# Patient Record
Sex: FEMALE | Race: WHITE | NOT HISPANIC OR LATINO
[De-identification: names, ages, dates, MRNs, and addresses within clinical notes are randomized per-mention and may not be internally consistent; named-entity substitution may affect disease eponyms.]

---

## 2018-08-21 ENCOUNTER — APPOINTMENT (OUTPATIENT)
Dept: BREAST CENTER | Facility: CLINIC | Age: 78
End: 2018-08-21

## 2019-02-05 ENCOUNTER — APPOINTMENT (OUTPATIENT)
Dept: BREAST CENTER | Facility: CLINIC | Age: 79
End: 2019-02-05
Payer: MEDICARE

## 2019-02-05 DIAGNOSIS — Z85.828 PERSONAL HISTORY OF OTHER MALIGNANT NEOPLASM OF SKIN: ICD-10-CM

## 2019-02-05 DIAGNOSIS — Z80.1 FAMILY HISTORY OF MALIGNANT NEOPLASM OF TRACHEA, BRONCHUS AND LUNG: ICD-10-CM

## 2019-02-05 DIAGNOSIS — Z82.3 FAMILY HISTORY OF STROKE: ICD-10-CM

## 2019-02-05 DIAGNOSIS — Z78.9 OTHER SPECIFIED HEALTH STATUS: ICD-10-CM

## 2019-02-05 DIAGNOSIS — Z82.49 FAMILY HISTORY OF ISCHEMIC HEART DISEASE AND OTHER DISEASES OF THE CIRCULATORY SYSTEM: ICD-10-CM

## 2019-02-05 PROCEDURE — 99215 OFFICE O/P EST HI 40 MIN: CPT

## 2019-02-05 RX ORDER — ALPRAZOLAM 0.5 MG/1
0.5 TABLET ORAL
Qty: 30 | Refills: 0 | Status: DISCONTINUED | COMMUNITY
Start: 2018-10-09

## 2019-02-05 NOTE — HISTORY OF PRESENT ILLNESS
[FreeTextEntry1] : This is a 79 yo female patient with a hx of breast cancer. Patient presents today for a follow up CBE. Patient is s/p right breast partial mastectomy on 02/05/2014 ([facility]). 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 Patient is s/p RTX in 05/2014. Patient is s/p right breast stereotactic biopsy on 12/10/2013. Pathology showed right breast 6:00 DCIS high nuclear grade, solid pattern with extension into lobules necrosis and calcifications ER/[place]+ HER2-, Ki-67 negative favorable tumor suppression gene (p53) negative favorable. Patient is s/p usd breast biopsy on 12/06/2013. 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 Patient is s/p left breast biopsy in 1980. Pathology showed sclerosing adenosis. \par \par Patient was last seen by medical oncologist (Dr. Malone) on 01/19/2018. Patient has no breast complaints today. \par \par She does  no perform SBE. \par She has not noticed a change in her breast or a breast lump.\par She has not noticed a change in her nipple or nipple area.\par She has not noticed a change in the skin of the breast.\par She is not experiencing nipple discharge.\par She is not experiencing breast pain.\par She has not noticed a lump or lymph node under the armpit. \par \par BREAST CANCER RISK FACTORS\par Menarche: 10\par Date of LMP: 1993\par Menopause: Post \par Grav:  5    Para: 4\par Age at first live birth: 24\par Nursed: No \par Hysterectomy: No \par Oophorectomy: No \par OCP: Yes in the past for 3-4 months \par HRT: No \par Last pap/pelvic exam: 07/2017 WNL ---- pelvic exam \par Related family history: None \par Ashkenazi: None \par Mastery risk assessment: N/A \par BRCA testing: No \par Bra size: 37C\par \par Last mammogram:      12/14/2018         Location: [facility]\par Report reviewed.                             \par Results: Birads 2\par No evidence of malignancy \par \par Last ultrasound:             12/14/2018             [facility]: [facility]\par Report reviewed.                             \par Results: Birads 2\par No evidence of malignancy \par \par Last MRI:   12/10/2013                                          Location: [facility]\par Report reviewed.\par Results: Birads 6 right breast Birads 2 left breast\par \par

## 2019-02-05 NOTE — REVIEW OF SYSTEMS
[Loss Of Hearing] : hearing loss [Nasal Discharge] : nasal discharge [Snoring] : snoring [Incontinence] : incontinence [Joint Pain] : joint pain [Anxiety] : anxiety [Depression] : depression [Heat Intolerance] : intolerance to heat [Loss of Hair] : loss of hair [Negative] : Heme/Lymph [de-identified] : sTRESS

## 2019-02-05 NOTE — PHYSICAL EXAM
[Normocephalic] : normocephalic [Atraumatic] : atraumatic [Supple] : supple [No Supraclavicular Adenopathy] : no supraclavicular adenopathy [Examined in the supine and seated position] : examined in the supine and seated position [No dominant masses] : no dominant masses in right breast  [No dominant masses] : no dominant masses left breast [No Nipple Retraction] : no left nipple retraction [No Nipple Discharge] : no left nipple discharge [No Axillary Lymphadenopathy] : no left axillary lymphadenopathy [No Edema] : no edema [No Rashes] : no rashes [No Ulceration] : no ulceration [de-identified] : s/p PmxI'll brawny induration, healed transverse incision 3:00 scar with increased nodularity along the lateral edge of the incision [de-identified] : Healed axillary incision

## 2019-02-05 NOTE — ASSESSMENT
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 Malone\par We reviewed risk reduction strategies including maintaining a BMI <25, limiting red meat intake and alcoholic beverages to 3 per week and exercise (150 min/ week low intensity or 75 min/week high intensity). And maintaining a normal vitamin D level.\par \par \par She knows to call or return sooner should any concerns or questions arise.\par

## 2019-05-07 ENCOUNTER — APPOINTMENT (OUTPATIENT)
Dept: BREAST CENTER | Facility: CLINIC | Age: 79
End: 2019-05-07
Payer: MEDICARE

## 2019-05-07 VITALS
HEIGHT: 61.5 IN | BODY MASS INDEX: 30.19 KG/M2 | WEIGHT: 162 LBS | DIASTOLIC BLOOD PRESSURE: 97 MMHG | HEART RATE: 82 BPM | SYSTOLIC BLOOD PRESSURE: 149 MMHG

## 2019-05-07 PROCEDURE — 99214 OFFICE O/P EST MOD 30 MIN: CPT

## 2019-05-07 NOTE — REVIEW OF SYSTEMS
[Nasal Discharge] : nasal discharge [Joint Pain] : joint pain [Incontinence] : incontinence [Snoring] : snoring [Loss of Hair] : loss of hair [Heartburn] : heartburn [Negative] : Psychiatric

## 2019-08-20 ENCOUNTER — APPOINTMENT (OUTPATIENT)
Dept: BREAST CENTER | Facility: CLINIC | Age: 79
End: 2019-08-20
Payer: MEDICARE

## 2019-08-20 VITALS
DIASTOLIC BLOOD PRESSURE: 80 MMHG | SYSTOLIC BLOOD PRESSURE: 120 MMHG | BODY MASS INDEX: 31.56 KG/M2 | WEIGHT: 165 LBS | HEIGHT: 60.5 IN | HEART RATE: 81 BPM

## 2019-08-20 PROCEDURE — 99215 OFFICE O/P EST HI 40 MIN: CPT

## 2019-08-20 RX ORDER — ANASTROZOLE TABLETS 1 MG/1
1 TABLET ORAL
Qty: 90 | Refills: 0 | Status: ACTIVE | COMMUNITY
Start: 2019-01-19

## 2019-08-20 RX ORDER — RANITIDINE 300 MG/1
300 TABLET ORAL
Qty: 90 | Refills: 0 | Status: DISCONTINUED | COMMUNITY
Start: 2018-08-26 | End: 2019-08-20

## 2019-08-20 RX ORDER — IPRATROPIUM BROMIDE 42 UG/1
0.06 SPRAY NASAL
Qty: 15 | Refills: 0 | Status: DISCONTINUED | COMMUNITY
Start: 2019-01-27 | End: 2019-08-20

## 2019-08-20 NOTE — REVIEW OF SYSTEMS
[Nasal Discharge] : nasal discharge [Snoring] : snoring [Heartburn] : heartburn [Joint Pain] : joint pain [Incontinence] : incontinence [Loss of Hair] : loss of hair [Negative] : Heme/Lymph

## 2019-08-20 NOTE — PHYSICAL EXAM
[Normocephalic] : normocephalic [Supple] : supple [Atraumatic] : atraumatic [No Supraclavicular Adenopathy] : no supraclavicular adenopathy [Examined in the supine and seated position] : examined in the supine and seated position [No dominant masses] : no dominant masses left breast [No dominant masses] : no dominant masses in right breast  [No Nipple Retraction] : no left nipple retraction [No Nipple Discharge] : no left nipple discharge [No Axillary Lymphadenopathy] : no left axillary lymphadenopathy [No Edema] : no edema [No Ulceration] : no ulceration [No Rashes] : no rashes [de-identified] : s/p PmxI'll brawny induration, healed transverse incision 3:00 scar with increased nodularity along the lateral edge of the incision unchanged from prior 8mm in size [de-identified] : Healed axillary incision

## 2019-08-20 NOTE — HISTORY OF PRESENT ILLNESS
[FreeTextEntry1] : This is a 80 yo female patient with a hx of breast cancer. Patient presents today for a follow up CBE. Patient is s/p right breast partial mastectomy on 02/05/2014 ([facility]). 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 Patient is s/p RTX in 05/2014. Patient is s/p right breast stereotactic biopsy on 12/10/2013. Pathology showed right breast 6:00 DCIS high nuclear grade, solid pattern with extension into lobules necrosis and calcifications ER/[place]+ HER2-, Ki-67 negative favorable tumor suppression gene (p53) negative favorable. Patient is s/p usd breast biopsy on 12/06/2013. 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 Patient is s/p left breast biopsy in 1980. Pathology showed sclerosing adenosis. \par \par Patient was last seen by medical oncologist (Dr. Malone) on 03/15/2019. Has appointment in September 2019 Patient has no breast complaints today. \par \par She does not perform SBE. \par She has not noticed a change in her breast or a breast lump.\par She has not noticed a change in her nipple or nipple area.\par She has not noticed a change in the skin of the breast.\par She is not experiencing nipple discharge.\par She is not experiencing breast pain.\par She has not noticed a lump or lymph node under the armpit. \par \par BREAST CANCER RISK FACTORS\par Menarche: 10\par Date of LMP: 1993\par Menopause: Post \par Grav:  5    Para: 4\par Age at first live birth: 24\par Nursed: No \par Hysterectomy: No \par Oophorectomy: No \par OCP: Yes in the past for 3-4 months \par HRT: No \par Last pap/pelvic exam: 07/2019 WNL ---- pelvic exam \par Related family history: None \par Ashkenazi: None \par Mastery risk assessment: N/A \par BRCA testing: No \par Bra size: 37C\par \par Last mammogram:      12/14/2018         Location: [facility]\par Report reviewed.                             \par Results: Birads 2\par No evidence of malignancy \par \par Last ultrasound:             12/14/2018             [facility]: [facility]\par Report reviewed.                             \par Results: Birads 2\par No evidence of malignancy \par \par Last MRI:   2/19/2019                                        Location: [facility]\par Report reviewed.\par Results: Birads 2 right breast Right breast developing focus of necrosis measuring 14 x 8 mm located 3-4 cm from the nipple probably correlating with the nodularity on physical exam. No evidence of malignancy bilaterally\par  Birads 1 left breast

## 2019-08-20 NOTE — ASSESSMENT
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 Malone\par We reviewed risk reduction strategies including maintaining a BMI <25, limiting red meat intake and alcoholic beverages to 3 per week and exercise (150 min/ week low intensity or 75 min/week high intensity). And maintaining a normal vitamin D level.\par \par \par She knows to call or return sooner should any concerns or questions arise.\par

## 2019-09-05 NOTE — PHYSICAL EXAM
[Normocephalic] : normocephalic [Atraumatic] : atraumatic [Supple] : supple [Examined in the supine and seated position] : examined in the supine and seated position [No Supraclavicular Adenopathy] : no supraclavicular adenopathy [No dominant masses] : no dominant masses in right breast  [No dominant masses] : no dominant masses left breast [No Nipple Retraction] : no left nipple retraction [No Nipple Discharge] : no left nipple discharge [No Axillary Lymphadenopathy] : no left axillary lymphadenopathy [No Rashes] : no rashes [No Edema] : no edema [No Ulceration] : no ulceration [de-identified] : s/p PmxI'll brawny induration, healed transverse incision 3:00 scar with increased nodularity along the lateral edge of the incision unchanged from prior [de-identified] : Healed axillary incision

## 2019-09-05 NOTE — HISTORY OF PRESENT ILLNESS
[FreeTextEntry1] : This is a 80 yo female patient with a hx of breast cancer. Patient presents today for a follow up CBE. Patient is s/p right breast partial mastectomy on 02/05/2014 ([facility]). 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 Patient is s/p RTX in 05/2014. Patient is s/p right breast stereotactic biopsy on 12/10/2013. Pathology showed right breast 6:00 DCIS high nuclear grade, solid pattern with extension into lobules necrosis and calcifications ER/[place]+ HER2-, Ki-67 negative favorable tumor suppression gene (p53) negative favorable. Patient is s/p usd breast biopsy on 12/06/2013. 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 Patient is s/p left breast biopsy in 1980. Pathology showed sclerosing adenosis. \par \par Patient was last seen by medical oncologist (Dr. Malone) on 03/15/2019. Has appointment in September 2019 Patient has no breast complaints today. \par \par She does not perform SBE. \par She has not noticed a change in her breast or a breast lump.\par She has not noticed a change in her nipple or nipple area.\par She has not noticed a change in the skin of the breast.\par She is not experiencing nipple discharge.\par She is not experiencing breast pain.\par She has not noticed a lump or lymph node under the armpit. \par \par BREAST CANCER RISK FACTORS\par Menarche: 10\par Date of LMP: 1993\par Menopause: Post \par Grav:  5    Para: 4\par Age at first live birth: 24\par Nursed: No \par Hysterectomy: No \par Oophorectomy: No \par OCP: Yes in the past for 3-4 months \par HRT: No \par Last pap/pelvic exam: 07/2019 WNL ---- pelvic exam \par Related family history: None \par Ashkenazi: None \par Mastery risk assessment: N/A \par BRCA testing: No \par Bra size: 37C\par \par Last mammogram:      12/14/2018         Location: [facility]\par Report reviewed.                             \par Results: Birads 2\par No evidence of malignancy \par \par Last ultrasound:             12/14/2018             Location: [facility]\par Report reviewed.                             \par Results: Birads 2\par No evidence of malignancy \par \par Last MRI:   2/19/2019                                        Location: [facility]\par Report reviewed.\par Results: Birads 2 right breast Right breast developing focus of necrosis measuring 14 x 8 mm located 3-4 cm from the nipple probably correlating with the nodularity on physical exam. No evidence of malignancy bilaterally\par  Birads 1 left breast

## 2019-09-05 NOTE — ASSESSMENT
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 Malone\par We reviewed risk reduction strategies including maintaining a BMI <25, limiting red meat intake and alcoholic beverages to 3 per week and exercise (150 min/ week low intensity or 75 min/week high intensity). And maintaining a normal vitamin D level.\par \par \par She knows to call or return sooner should any concerns or questions arise.\par

## 2020-01-07 ENCOUNTER — APPOINTMENT (OUTPATIENT)
Dept: BREAST CENTER | Facility: CLINIC | Age: 80
End: 2020-01-07
Payer: MEDICARE

## 2020-01-07 VITALS
SYSTOLIC BLOOD PRESSURE: 127 MMHG | WEIGHT: 161 LBS | DIASTOLIC BLOOD PRESSURE: 87 MMHG | BODY MASS INDEX: 30.4 KG/M2 | HEART RATE: 83 BPM | HEIGHT: 61 IN

## 2020-01-07 PROCEDURE — 99215 OFFICE O/P EST HI 40 MIN: CPT

## 2020-01-07 NOTE — HISTORY OF PRESENT ILLNESS
[FreeTextEntry1] : This is a 80 yo female patient with a hx of breast cancer. Patient presents today for a follow up CBE. Patient is s/p right breast partial mastectomy on 02/05/2014 ([facility]). 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 Patient is s/p RTX in 05/2014. Patient is s/p right breast stereotactic biopsy on 12/10/2013. Pathology showed right breast 6:00 DCIS high nuclear grade, solid pattern with extension into lobules necrosis and calcifications ER/[place]+ HER2-, Ki-67 negative favorable tumor suppression gene (p53) negative favorable. Patient is s/p usd breast biopsy on 12/06/2013. 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 Patient is s/p left breast biopsy in 1980. Pathology showed sclerosing adenosis. \par \par Patient was last seen by medical oncologist (Dr. Malone) on 09/2019. Has appointment in March 2019 Patient has no breast complaints today. \par \par She does not perform SBE. \par She has not noticed a change in her breast or a breast lump.\par She has not noticed a change in her nipple or nipple area.\par She has not noticed a change in the skin of the breast.\par She is not experiencing nipple discharge.\par She is not experiencing breast pain.\par She has not noticed a lump or lymph node under the armpit. \par \par BREAST CANCER RISK FACTORS\par Menarche: 10\par Date of LMP: 1993\par Menopause: Post \par Grav:  5    Para: 4\par Age at first live birth: 24\par Nursed: No \par Hysterectomy: No \par Oophorectomy: No \par OCP: Yes in the past for 3-4 months \par HRT: No \par Last pap/pelvic exam: 07/2019 WNL ---- pelvic exam \par Related family history: None \par Ashkenazi: None \par Mastery risk assessment: N/A \par BRCA testing: No \par Bra size: 37C\par \par Last mammogram:      12/16/2019         Location: [facility]\par Report reviewed.                             \par Results: Birads 2\par No evidence of malignancy \par \par Last ultrasound:             12/16/2019             [facility]: [facility]\par Report reviewed.                             \par Results: Birads 2\par No evidence of malignancy \par \par Last MRI:   2/19/2019                                        [facility]\par Report reviewed.\par Results: Birads 2 right breast Right breast developing focus of necrosis measuring 14 x 8 mm located 3-4 cm from the nipple probably correlating with the nodularity on physical exam. No evidence of malignancy bilaterally\par  Birads 1 left breast

## 2020-01-07 NOTE — PHYSICAL EXAM
[Normocephalic] : normocephalic [Supple] : supple [Atraumatic] : atraumatic [Examined in the supine and seated position] : examined in the supine and seated position [No Supraclavicular Adenopathy] : no supraclavicular adenopathy [No dominant masses] : no dominant masses left breast [No dominant masses] : no dominant masses in right breast  [No Nipple Retraction] : no left nipple retraction [No Nipple Discharge] : no left nipple discharge [No Axillary Lymphadenopathy] : no left axillary lymphadenopathy [No Edema] : no edema [No Rashes] : no rashes [No Ulceration] : no ulceration [de-identified] : s/p PmxI'll brawny induration, healed transverse incision 3:00 scar with increased nodularity along the lateral edge of the incision decreased from prior 8mm in size [de-identified] : Healed axillary incision

## 2020-01-07 NOTE — REVIEW OF SYSTEMS
[Nasal Discharge] : nasal discharge [Snoring] : snoring [Heartburn] : heartburn [Incontinence] : incontinence [Joint Pain] : joint pain [Loss of Hair] : loss of hair [Negative] : Heme/Lymph

## 2020-01-07 NOTE — ASSESSMENT
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 Malone\par We reviewed risk reduction strategies including maintaining a BMI <25, limiting red meat intake and alcoholic beverages to 3 per week and exercise (150 min/ week low intensity or 75 min/week high intensity). And maintaining a normal vitamin D level.\par \par f/u with me 6 months\par She knows to call or return sooner should any concerns or questions arise.\par

## 2020-07-07 ENCOUNTER — APPOINTMENT (OUTPATIENT)
Dept: BREAST CENTER | Facility: CLINIC | Age: 80
End: 2020-07-07
Payer: MEDICARE

## 2020-07-07 VITALS
BODY MASS INDEX: 30.4 KG/M2 | HEART RATE: 81 BPM | WEIGHT: 161 LBS | SYSTOLIC BLOOD PRESSURE: 129 MMHG | HEIGHT: 61 IN | DIASTOLIC BLOOD PRESSURE: 84 MMHG

## 2020-07-07 DIAGNOSIS — Z80.52 FAMILY HISTORY OF MALIGNANT NEOPLASM OF BLADDER: ICD-10-CM

## 2020-07-07 PROCEDURE — 99215 OFFICE O/P EST HI 40 MIN: CPT

## 2020-07-07 RX ORDER — SERTRALINE HYDROCHLORIDE 100 MG/1
100 TABLET, FILM COATED ORAL
Qty: 90 | Refills: 0 | Status: DISCONTINUED | COMMUNITY
Start: 2018-08-28 | End: 2020-07-07

## 2020-07-07 NOTE — HISTORY OF PRESENT ILLNESS
[FreeTextEntry1] : This is a 81 yo female patient with a hx of breast cancer. Patient presents today for a follow up CBE. Patient is s/p right breast partial mastectomy on 02/05/2014 ([facility]). 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 Patient is s/p RTX in 05/2014. Patient is s/p right breast stereotactic biopsy on 12/10/2013. Pathology showed right breast 6:00 DCIS high nuclear grade, solid pattern with extension into lobules necrosis and calcifications ER/[place]+ HER2-, Ki-67 negative favorable tumor suppression gene (p53) negative favorable. Patient is s/p usd breast biopsy on 12/06/2013. 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 Patient is s/p left breast biopsy in 1980. Pathology showed sclerosing adenosis. \par \par Patient was last seen by medical oncologist (Dr. Malone) on 5/2020. Patient has no breast complaints today. \par \par She does not perform SBE. \par She has not noticed a change in her breast or a breast lump.\par She has not noticed a change in her nipple or nipple area.\par She has not noticed a change in the skin of the breast.\par She is not experiencing nipple discharge.\par She is not experiencing breast pain.\par She has not noticed a lump or lymph node under the armpit. \par \par BREAST CANCER RISK FACTORS\par Menarche: 10\par Date of LMP: 1993\par Menopause: Post \par Grav:  5    Para: 4\par Age at first live birth: 24\par Nursed: No \par Hysterectomy: No \par Oophorectomy: No \par OCP: Yes in the past for 3-4 months \par HRT: No \par Last pap/pelvic exam: 07/2019 WNL ---- pelvic exam \par Related family history: None \par Ashkenazi: None \par Mastery risk assessment: N/A \par BRCA testing: No \par Bra size: 37C\par \par Last mammogram:      12/16/2019         Location: [facility]\par Report reviewed.                             \par Results: Birads 2\par No evidence of malignancy \par \par Last ultrasound:             12/16/2019             Location: [facility]\par Report reviewed.                             \par Results: Birads 2\par No evidence of malignancy \par \par Last MRI:   2/19/2019                                        Location[facility]\par Report reviewed.\par Results: Birads 2 right breast Right breast developing focus of necrosis measuring 14 x 8 mm located 3-4 cm from the nipple probably correlating with the nodularity on physical exam. No evidence of malignancy bilaterally\par  Birads 1 left breast

## 2020-07-07 NOTE — PHYSICAL EXAM
[Atraumatic] : atraumatic [Normocephalic] : normocephalic [Supple] : supple [Examined in the supine and seated position] : examined in the supine and seated position [No Supraclavicular Adenopathy] : no supraclavicular adenopathy [No dominant masses] : no dominant masses in right breast  [No dominant masses] : no dominant masses left breast [No Nipple Retraction] : no left nipple retraction [No Nipple Discharge] : no left nipple discharge [No Axillary Lymphadenopathy] : no left axillary lymphadenopathy [No Edema] : no edema [No Rashes] : no rashes [No Ulceration] : no ulceration [de-identified] : Healed axillary incision [de-identified] : s/p Pmx mild brawny induration, healed transverse incision 3:00 scar with increased nodularity along the lateral edge of the incision decreased from prior 8mm in size

## 2020-07-07 NOTE — REVIEW OF SYSTEMS
[Snoring] : snoring [Nasal Discharge] : nasal discharge [Incontinence] : incontinence [Heartburn] : heartburn [Loss of Hair] : loss of hair [Joint Pain] : joint pain [Negative] : Psychiatric

## 2020-07-07 NOTE — ASSESSMENT
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 Malone\par We reviewed risk reduction strategies including maintaining a BMI <25, limiting red meat intake and alcoholic beverages to 3 per week and exercise (150 min/ week low intensity or 75 min/week high intensity). And maintaining a normal vitamin D level.\par \par f/u with me 6 months\par She knows to call or return sooner should any concerns or questions arise.\par

## 2021-01-06 ENCOUNTER — NON-APPOINTMENT (OUTPATIENT)
Age: 81
End: 2021-01-06

## 2021-05-04 ENCOUNTER — APPOINTMENT (OUTPATIENT)
Dept: BREAST CENTER | Facility: CLINIC | Age: 81
End: 2021-05-04
Payer: MEDICARE

## 2021-05-04 ENCOUNTER — NON-APPOINTMENT (OUTPATIENT)
Age: 81
End: 2021-05-04

## 2021-05-04 VITALS
WEIGHT: 151 LBS | SYSTOLIC BLOOD PRESSURE: 126 MMHG | BODY MASS INDEX: 28.51 KG/M2 | HEIGHT: 61 IN | HEART RATE: 81 BPM | DIASTOLIC BLOOD PRESSURE: 78 MMHG

## 2021-05-04 PROCEDURE — 99215 OFFICE O/P EST HI 40 MIN: CPT

## 2021-05-04 NOTE — HISTORY OF PRESENT ILLNESS
[FreeTextEntry1] : This is a 82 yo female patient with a hx of breast cancer. Patient presents today for a follow up CBE. Patient is s/p right breast partial mastectomy on 02/05/2014 ([facility]). 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 Patient is s/p RTX in 05/2014. Patient is s/p right breast stereotactic biopsy on 12/10/2013. Pathology showed right breast 6:00 DCIS high nuclear grade, solid pattern with extension into lobules necrosis and calcifications ER/TN+ HER2-, Ki-67 negative favorable tumor suppression gene (p53) negative favorable. Patient is s/p usd breast biopsy on 12/06/2013. 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 Patient is s/p left breast biopsy in 1980. Pathology showed sclerosing adenosis. \par \par Patient was last seen by medical oncologist (Dr. Malone) on 5/2020. Patient has no breast complaints today. \par She had moderna left completed 3/15/2021. She is s/p right breast 6N2 1/12/2021 [facility] path showed fat necrosis.\par \par She does not perform SBE. \par She has not noticed a change in her breast or a breast lump.\par She has not noticed a change in her nipple or nipple area.\par She has not noticed a change in the skin of the breast.\par She is not experiencing nipple discharge.\par She is not experiencing breast pain.\par She has not noticed a lump or lymph node under the armpit. \par \par BREAST CANCER RISK FACTORS\par Menarche: 10\par Date of LMP: 1993\par Menopause: Post \par Grav:  5    Para: 4\par Age at first live birth: 24\par Nursed: No \par Hysterectomy: No \par Oophorectomy: No \par OCP: Yes in the past for 3-4 months \par HRT: No \par Last pap/pelvic exam: 07/2019 WNL ---- pelvic exam has an appointment 7/2021\par Related family history: None \par Ashkenazi: None \par Mastery risk assessment: N/A \par BRCA testing: No \par Bra size: 37C\par \par Last mammogram:      12/31/2020         [facility]: [facility]\par Report reviewed.                             \par Results: Birads 4\par \par Last ultrasound:        12/31/2020             Location: [facility]\par Report reviewed.                             \par Results: Birads 4\par \par \par Last MRI:   2/19/2019                                        Location: [facility]\par Report reviewed.\par Results: Birads 2 right breast Right breast developing focus of necrosis measuring 14 x 8 mm located 3-4 cm from the nipple probably correlating with the nodularity on physical exam. No evidence of malignancy bilaterally\par  Birads 1 left breast

## 2021-05-04 NOTE — PHYSICAL EXAM
[Normocephalic] : normocephalic [Atraumatic] : atraumatic [Supple] : supple [No Supraclavicular Adenopathy] : no supraclavicular adenopathy [Examined in the supine and seated position] : examined in the supine and seated position [No dominant masses] : no dominant masses in right breast  [No dominant masses] : no dominant masses left breast [No Nipple Retraction] : no left nipple retraction [No Nipple Discharge] : no left nipple discharge [No Axillary Lymphadenopathy] : no left axillary lymphadenopathy [No Edema] : no edema [No Rashes] : no rashes [No Ulceration] : no ulceration [Symmetrical] : symmetrical [de-identified] : s/p Pmx mild brawny induration, healed transverse incision 3:00 scar with increased nodularity along the lateral edge of the incision decreased from prior 8mm in size [de-identified] : Healed axillary incision

## 2021-05-04 NOTE — ASSESSMENT
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 Malone\par We reviewed risk reduction strategies including maintaining a BMI <25, limiting red meat intake and alcoholic beverages to 3 per week and exercise (150 min/ week low intensity or 75 min/week high intensity). And maintaining a normal vitamin D level.\par \par f/u with me JAN 2022\par She knows to call or return sooner should any concerns or questions arise.\par

## 2022-01-18 ENCOUNTER — APPOINTMENT (OUTPATIENT)
Dept: BREAST CENTER | Facility: CLINIC | Age: 82
End: 2022-01-18
Payer: MEDICARE

## 2022-01-18 VITALS
WEIGHT: 151 LBS | SYSTOLIC BLOOD PRESSURE: 146 MMHG | DIASTOLIC BLOOD PRESSURE: 91 MMHG | BODY MASS INDEX: 28.51 KG/M2 | HEIGHT: 61 IN | HEART RATE: 84 BPM

## 2022-01-18 DIAGNOSIS — Z86.19 PERSONAL HISTORY OF OTHER INFECTIOUS AND PARASITIC DISEASES: ICD-10-CM

## 2022-01-18 PROCEDURE — 99214 OFFICE O/P EST MOD 30 MIN: CPT

## 2022-01-18 RX ORDER — ESCITALOPRAM OXALATE 20 MG/1
20 TABLET ORAL
Qty: 90 | Refills: 0 | Status: ACTIVE | COMMUNITY
Start: 2021-09-13

## 2022-01-18 NOTE — ASSESSMENT
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 Malone\par \par We reviewed risk reduction strategies including maintaining a BMI <25, limiting red meat intake and alcoholic beverages to 3 per week and exercise (150 min/ week low intensity or 75 min/week high intensity). And maintaining a normal vitamin D level.\par \par f/u with me JAN 2023\par She knows to call or return sooner should any concerns or questions arise.\par

## 2022-01-18 NOTE — PHYSICAL EXAM
[Normocephalic] : normocephalic [Atraumatic] : atraumatic [Supple] : supple [No Supraclavicular Adenopathy] : no supraclavicular adenopathy [Examined in the supine and seated position] : examined in the supine and seated position [Symmetrical] : symmetrical [No dominant masses] : no dominant masses in right breast  [No dominant masses] : no dominant masses left breast [No Nipple Retraction] : no left nipple retraction [No Nipple Discharge] : no left nipple discharge [No Axillary Lymphadenopathy] : no left axillary lymphadenopathy [No Edema] : no edema [No Rashes] : no rashes [No Ulceration] : no ulceration [de-identified] : s/p Pmx mild brawny induration, healed transverse incision 3:00 scar with increased nodularity along the lateral edge of the incision decreased from prior 8mm in size [de-identified] : Healed axillary incision

## 2022-01-18 NOTE — HISTORY OF PRESENT ILLNESS
[FreeTextEntry1] : This is a 82 yo female patient with a hx of breast cancer. Patient presents today for a follow up CBE. Patient is s/p right breast partial mastectomy on 02/05/2014 ([facility]). 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 Patient is s/p RTX in 05/2014. Patient is s/p right breast stereotactic biopsy on 12/10/2013. Pathology showed right breast 6:00 DCIS high nuclear grade, solid pattern with extension into lobules necrosis and calcifications ER/NC+ HER2-, Ki-67 negative favorable tumor suppression gene (p53) negative favorable. Patient is s/p usd breast biopsy on 12/06/2013. 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 Patient is s/p left breast biopsy in 1980. Pathology showed sclerosing adenosis. \par \par Patient was last seen by medical oncologist (Dr. Malone) on 9/28/2021. Patient has no breast complaints today. \par She had moderna left completed 3/15/2021. She is s/p right breast 6N2 1/12/2021 [facility] path showed fat necrosis.\par \par She does not perform SBE. \par She has not noticed a change in her breast or a breast lump.\par She has not noticed a change in her nipple or nipple area.\par She has not noticed a change in the skin of the breast.\par She is not experiencing nipple discharge.\par She is not experiencing breast pain.\par She has not noticed a lump or lymph node under the armpit. \par \par BREAST CANCER RISK FACTORS\par Menarche: 10\par Date of LMP: 1993\par Menopause: Post \par Grav:  5    Para: 4\par Age at first live birth: 24\par Nursed: No \par Hysterectomy: No \par Oophorectomy: No \par OCP: Yes in the past for 3-4 months \par HRT: No \par Last pap/pelvic exam: 07/2021 WNL \par Related family history: None \par Ashkenazi: None \par Mastery risk assessment: N/A \par BRCA testing: No \par Bra size: 37C\par \par Last mammogram:      1/5/2022        Location: [facility]\par Report reviewed.                             \par Results: Birads 2\par stable exam with no evidence of malignancy.\par \par Last ultrasound:        1/5/2022             Location: [facility]\par Report reviewed.                             \par Results: Birads 2\par stable exam with no evidence of malignancy. \par \par Last MRI:   2/19/2019                                        Location: [facility]\par Report reviewed.\par Results: Birads 2 right breast Right breast developing focus of necrosis measuring 14 x 8 mm located 3-4 cm from the nipple probably correlating with the nodularity on physical exam. No evidence of malignancy bilaterally\par  Birads 1 left breast

## 2022-01-18 NOTE — REVIEW OF SYSTEMS
[Nasal Discharge] : nasal discharge [Snoring] : snoring [Heartburn] : heartburn [Incontinence] : incontinence [Loss of Hair] : loss of hair [Negative] : Heme/Lymph

## 2022-08-16 ENCOUNTER — APPOINTMENT (OUTPATIENT)
Dept: BREAST CENTER | Facility: CLINIC | Age: 82
End: 2022-08-16

## 2022-08-16 VITALS
SYSTOLIC BLOOD PRESSURE: 126 MMHG | HEART RATE: 87 BPM | HEIGHT: 61 IN | DIASTOLIC BLOOD PRESSURE: 79 MMHG | WEIGHT: 151 LBS | BODY MASS INDEX: 28.51 KG/M2

## 2022-08-16 DIAGNOSIS — R92.8 OTHER ABNORMAL AND INCONCLUSIVE FINDINGS ON DIAGNOSTIC IMAGING OF BREAST: ICD-10-CM

## 2022-08-16 PROCEDURE — 99213 OFFICE O/P EST LOW 20 MIN: CPT

## 2022-08-16 RX ORDER — METHYLPREDNISOLONE 4 MG/1
4 TABLET ORAL
Qty: 21 | Refills: 0 | Status: DISCONTINUED | COMMUNITY
Start: 2021-08-16 | End: 2022-08-16

## 2022-08-16 RX ORDER — DOXYCYCLINE HYCLATE 100 MG/1
100 TABLET ORAL
Qty: 42 | Refills: 0 | Status: DISCONTINUED | COMMUNITY
Start: 2021-09-15 | End: 2022-08-16

## 2022-08-16 RX ORDER — PREDNISONE 20 MG/1
20 TABLET ORAL
Qty: 21 | Refills: 0 | Status: DISCONTINUED | COMMUNITY
Start: 2021-08-20 | End: 2022-08-16

## 2022-08-16 RX ORDER — ESCITALOPRAM OXALATE 5 MG/1
TABLET, FILM COATED ORAL
Refills: 0 | Status: DISCONTINUED | COMMUNITY
End: 2022-08-16

## 2022-08-16 NOTE — PHYSICAL EXAM
[Normocephalic] : normocephalic [Atraumatic] : atraumatic [Supple] : supple [No Supraclavicular Adenopathy] : no supraclavicular adenopathy [Examined in the supine and seated position] : examined in the supine and seated position [Symmetrical] : symmetrical [No dominant masses] : no dominant masses in right breast  [No dominant masses] : no dominant masses left breast [No Nipple Retraction] : no left nipple retraction [No Nipple Discharge] : no left nipple discharge [No Axillary Lymphadenopathy] : no left axillary lymphadenopathy [No Edema] : no edema [No Rashes] : no rashes [No Ulceration] : no ulceration [de-identified] : s/p Pmx mild brawny induration, healed transverse incision 3:00 scar with no long any nodularity [de-identified] : Healed axillary incision

## 2022-08-16 NOTE — ASSESSMENT
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 Malone\par \par We reviewed risk reduction strategies including maintaining a BMI <25, limiting red meat intake and alcoholic beverages to 3 per week and exercise (150 min/ week low intensity or 75 min/week high intensity). And maintaining a normal vitamin D level.\par Reviewed possible causes of breast pain such as limiting caffeine, stress, and lying on the breasts\par Discussed vit e oil and arnica gel to help decrease breast discomfort\par \par f/u with me JAN 2023\par She knows to call or return sooner should any concerns or questions arise.\par

## 2022-08-16 NOTE — HISTORY OF PRESENT ILLNESS
[FreeTextEntry1] : This is a 81 yo female patient with a hx of breast cancer. Patient presents today for a follow up CBE. Patient is s/p right breast partial mastectomy on 02/05/2014 ([facility]). 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 Patient is s/p RTX in 05/2014. Patient is s/p right breast stereotactic biopsy on 12/10/2013. Pathology showed right breast 6:00 DCIS high nuclear grade, solid pattern with extension into lobules necrosis and calcifications ER/MN+ HER2-, Ki-67 negative favorable tumor suppression gene (p53) negative favorable. Patient is s/p usd breast biopsy on 12/06/2013. 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 Patient is s/p left breast biopsy in 1980. Pathology showed sclerosing adenosis. \par \par She had moderna left completed 3/15/2021. She is s/p right breast 6N2 1/12/2021 [facility] path showed fat necrosis.\par \par Patient had lyme disease last August. Son was diagnosed with testicular cancer Jan 20121. Patient has a physical with Dr. Meng this month - no issues last year. Last OB/GYN appointment was July 2021 - she sees her every 2 years. Patient was last seen by medical oncologist (Dr. Malone) on 3/2022 and is still taking Anastrozole. Patient gets occasional intermittent "needle type" twinges in the right breast very randomly. No pain or tenderness in the breast. Patient has no breast complaints today. \par \par She does not perform SBE. \par She has not noticed a change in her breast or a breast lump.\par She has not noticed a change in her nipple or nipple area.\par She has not noticed a change in the skin of the breast.\par She is not experiencing nipple discharge.\par She is not experiencing breast pain.\par She has not noticed a lump or lymph node under the armpit. \par \par BREAST CANCER RISK FACTORS\par Menarche: 10\par Date of LMP: 1993\par Menopause: Post \par Grav:  5    Para: 4\par Age at first live birth: 24\par Nursed: No \par Hysterectomy: No \par Oophorectomy: No \par OCP: Yes in the past for 3-4 months \par HRT: No \par Last pap/pelvic exam: 07/2021 WNL \par Related family history: son testicular cancer diagnosed @57\par Ashkenazi: None \par Mastery risk assessment: N/A \par BRCA testing: No \par Bra size: 37C\par \par Last mammogram:   1/5/2022        Location: [facility]\par Report reviewed.                             \par Results: Birads 2\par stable exam with no evidence of malignancy.\par \par Last ultrasound:  1/5/2022             Location: [facility]\par Report reviewed.                             \par Results: Birads 2\par stable exam with no evidence of malignancy. \par \par Last MRI:   2/19/2019                                        Location: [facility]\par Report reviewed.\par Results: Birads 2 right breast Right breast developing focus of necrosis measuring 14 x 8 mm located 3-4 cm from the nipple probably correlating with the nodularity on physical exam. No evidence of malignancy bilaterally\par  Birads 1 left breast

## 2023-01-24 ENCOUNTER — APPOINTMENT (OUTPATIENT)
Dept: BREAST CENTER | Facility: CLINIC | Age: 83
End: 2023-01-24
Payer: MEDICARE

## 2023-01-24 VITALS
HEIGHT: 61 IN | WEIGHT: 151 LBS | HEART RATE: 41 BPM | DIASTOLIC BLOOD PRESSURE: 74 MMHG | SYSTOLIC BLOOD PRESSURE: 127 MMHG | BODY MASS INDEX: 28.51 KG/M2

## 2023-01-24 PROCEDURE — 99213 OFFICE O/P EST LOW 20 MIN: CPT

## 2023-01-24 NOTE — HISTORY OF PRESENT ILLNESS
[FreeTextEntry1] : This is a 83 yo female patient with a hx of breast cancer. Patient presents today for a follow up CBE. Patient is s/p right breast partial mastectomy on 02/05/2014 ([facility]). 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 Patient is s/p RTX in 05/2014. Patient is s/p right breast stereotactic biopsy on 12/10/2013. Pathology showed right breast 6:00 DCIS high nuclear grade, solid pattern with extension into lobules necrosis and calcifications ER/NM+ HER2-, Ki-67 negative favorable tumor suppression gene (p53) negative favorable. Patient is s/p usd breast biopsy on 12/06/2013. 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 Patient is s/p left breast biopsy in 1980. Pathology showed sclerosing adenosis.  She is on anastrozole with Dr. Malone.\par \par She had moderna left completed 3/15/2021. She is s/p right breast 6N2 1/12/2021 [facility] path showed fat necrosis.\par \par Patient had lyme disease last August. Son was diagnosed with testicular cancer Jan 2021.  Patient gets occasional intermittent "needle type" twinges in the right breast very randomly. No pain or tenderness in the breast. Patient has no breast complaints today. \par \par She does not perform SBE. \par She has not noticed a change in her breast or a breast lump.\par She has not noticed a change in her nipple or nipple area.\par She has not noticed a change in the skin of the breast.\par She is not experiencing nipple discharge.\par She is not experiencing breast pain.\par She has not noticed a lump or lymph node under the armpit. \par \par BREAST CANCER RISK FACTORS\par Menarche: 10\par Date of LMP: 1993\par Menopause: Post \par Grav:  5    Para: 4\par Age at first live birth: 24\par Nursed: No \par Hysterectomy: No \par Oophorectomy: No \par OCP: Yes in the past for 3-4 months \par HRT: No \par Last pap/pelvic exam: 07/2021 WNL \par Related family history: son testicular cancer diagnosed @57\par Ashkenazi: None \par Mastery risk assessment: N/A \par BRCA testing: No \par Bra size: 37C\par \par Last mammogram:   1/6/2023       [facility]: [facility]\par Report reviewed.                             \par Results: Birads 2\par Dense breasts, stable exam with no evidence of malignancy.\par \par Last ultrasound:  1/6/2023             [facility]: [facility]\par Report reviewed.                             \par Results: Birads 2\par stable exam with no evidence of malignancy. \par \par Last MRI:   2/19/2019                                        [facility]: [facility]\par Report reviewed.\par Results: Birads 2 right breast Right breast developing focus of necrosis measuring 14 x 8 mm located 3-4 cm from the nipple probably correlating with the nodularity on physical exam. No evidence of malignancy bilaterally\par  Birads 1 left breast

## 2023-01-24 NOTE — PHYSICAL EXAM
[Normocephalic] : normocephalic [Atraumatic] : atraumatic [Supple] : supple [No Supraclavicular Adenopathy] : no supraclavicular adenopathy [Examined in the supine and seated position] : examined in the supine and seated position [Symmetrical] : symmetrical [No dominant masses] : no dominant masses in right breast  [No dominant masses] : no dominant masses left breast [No Nipple Retraction] : no left nipple retraction [No Nipple Discharge] : no left nipple discharge [No Axillary Lymphadenopathy] : no left axillary lymphadenopathy [No Edema] : no edema [No Rashes] : no rashes [No Ulceration] : no ulceration [de-identified] : s/p Pmx mild brawny induration, healed transverse incision 3:00 scar with  nodularity c/w fat necrosis [de-identified] : Healed axillary incision

## 2023-01-24 NOTE — ASSESSMENT
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 Malone\par \par We reviewed risk reduction strategies including maintaining a BMI <25, limiting red meat intake and alcoholic beverages to 3 per week and exercise (150 min/ week low intensity or 75 min/week high intensity). And maintaining a normal vitamin D level.\par Reviewed possible causes of breast pain such as limiting caffeine, stress, and lying on the breasts\par Discussed vit e oil and arnica gel to help decrease breast discomfort\par \par f/u with me JAN 2024 she prefers 6 months\par She knows to call or return sooner should any concerns or questions arise.\par

## 2023-07-18 ENCOUNTER — APPOINTMENT (OUTPATIENT)
Dept: BREAST CENTER | Facility: CLINIC | Age: 83
End: 2023-07-18
Payer: MEDICARE

## 2023-07-18 VITALS
DIASTOLIC BLOOD PRESSURE: 87 MMHG | BODY MASS INDEX: 28.51 KG/M2 | HEART RATE: 81 BPM | HEIGHT: 61 IN | WEIGHT: 151 LBS | SYSTOLIC BLOOD PRESSURE: 123 MMHG

## 2023-07-18 DIAGNOSIS — N63.0 UNSPECIFIED LUMP IN UNSPECIFIED BREAST: ICD-10-CM

## 2023-07-18 DIAGNOSIS — Z17.0 ESTROGEN RECEPTOR POSITIVE STATUS [ER+]: ICD-10-CM

## 2023-07-18 DIAGNOSIS — Z12.31 ENCOUNTER FOR SCREENING MAMMOGRAM FOR MALIGNANT NEOPLASM OF BREAST: ICD-10-CM

## 2023-07-18 PROCEDURE — 99213 OFFICE O/P EST LOW 20 MIN: CPT

## 2023-07-18 NOTE — CONSULT LETTER
[Dear  ___] : Dear  [unfilled], [Courtesy Letter:] : I had the pleasure of seeing your patient, [unfilled], in my office today. [Please see my note below.] : Please see my note below. [Consult Closing:] : Thank you very much for allowing me to participate in the care of this patient.  If you have any questions, please do not hesitate to contact me. [Sincerely,] : Sincerely, [FreeTextEntry3] : Makayla Sánchez MS DO\par Breast Surgeon\par [facility] \[place] [address]\par  [DrClaire  ___] : Dr. VAZQUEZ

## 2023-07-18 NOTE — ASSESSMENT
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 Malone\par \par We reviewed risk reduction strategies including maintaining a BMI <25, limiting red meat intake and alcoholic beverages to 3 per week and exercise (150 min/ week low intensity or 75 min/week high intensity). And maintaining a normal vitamin D level.\par Reviewed possible causes of breast pain such as limiting caffeine, stress, and lying on the breasts\par Discussed vit e oil and arnica gel to help decrease breast discomfort\par \par f/u with me JAN 2024 \par She knows to call or return sooner should any concerns or questions arise.\par

## 2023-07-18 NOTE — PHYSICAL EXAM
[Normocephalic] : normocephalic [Atraumatic] : atraumatic [Supple] : supple [No Supraclavicular Adenopathy] : no supraclavicular adenopathy [Examined in the supine and seated position] : examined in the supine and seated position [Symmetrical] : symmetrical [No dominant masses] : no dominant masses in right breast  [No dominant masses] : no dominant masses left breast [No Nipple Retraction] : no left nipple retraction [No Nipple Discharge] : no left nipple discharge [No Axillary Lymphadenopathy] : no left axillary lymphadenopathy [No Edema] : no edema [No Rashes] : no rashes [No Ulceration] : no ulceration [de-identified] : s/p Pmx mild brawny induration, healed transverse incision 3:00 scar with  nodularity c/w fat necrosis [de-identified] : Healed axillary incision

## 2023-07-18 NOTE — HISTORY OF PRESENT ILLNESS
[FreeTextEntry1] : This is a 82 yo female patient with a hx of breast cancer. Patient presents today for a follow up CBE. Patient is s/p right breast partial mastectomy on 02/05/2014 ([facility]). 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 Patient is s/p RTX in 05/2014. Patient is s/p right breast stereotactic biopsy on 12/10/2013. Pathology showed right breast 6:00 DCIS high nuclear grade, solid pattern with extension into lobules necrosis and calcifications ER/FL+ HER2-, Ki-67 negative favorable tumor suppression gene (p53) negative favorable. Patient is s/p usd breast biopsy on 12/06/2013. 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 Patient is s/p left breast biopsy in 1980. Pathology showed sclerosing adenosis.  She is on anastrozole with Dr. Malone.\par \par She had moderna left completed 3/15/2021. She is s/p right breast 6N2 1/12/2021 [facility] path showed fat necrosis.\par \par Patient had lyme disease last August. Son was diagnosed with testicular cancer Jan 2021.  Patient gets less of her intermittent "needle type" twinges in the right breast very randomly. No pain or tenderness in the breast. Patient has no breast complaints today. \par \par She does not perform SBE. \par She has not noticed a change in her breast or a breast lump.\par She has not noticed a change in her nipple or nipple area.\par She has not noticed a change in the skin of the breast.\par She is not experiencing nipple discharge.\par She is not experiencing breast pain.\par She has not noticed a lump or lymph node under the armpit. \par \par BREAST CANCER RISK FACTORS\par Menarche: 10\par Date of LMP: 1993\par Menopause: Post \par Grav:  5    Para: 4\par Age at first live birth: 24\par Nursed: No \par Hysterectomy: No \par Oophorectomy: No \par OCP: Yes in the past for 3-4 months \par HRT: No \par Last pap/pelvic exam: 07/2021 WNL \par Related family history: son testicular cancer diagnosed @57\par Ashkenazi: None \par Mastery risk assessment: N/A \par BRCA testing: No \par Bra size: 37C\par \par Last mammogram:   1/6/2023       [facility]\par Report reviewed.                             \par Results: Birads 2\par Dense breasts, stable exam with no evidence of malignancy.\par \par Last ultrasound:  1/6/2023             [facility]\par Report reviewed.                             \par Results: Birads 2\par stable exam with no evidence of malignancy. \par \par Last MRI:   2/19/2019                                        [facility]\par Report reviewed.\par Results: Birads 2 right breast Right breast developing focus of necrosis measuring 14 x 8 mm located 3-4 cm from the nipple probably correlating with the nodularity on physical exam. No evidence of malignancy bilaterally\par  Birads 1 left breast

## 2023-11-27 ENCOUNTER — APPOINTMENT (OUTPATIENT)
Dept: HEART AND VASCULAR | Facility: CLINIC | Age: 83
End: 2023-11-27
Payer: MEDICARE

## 2023-11-27 ENCOUNTER — NON-APPOINTMENT (OUTPATIENT)
Age: 83
End: 2023-11-27

## 2023-11-27 VITALS
HEIGHT: 61 IN | WEIGHT: 152 LBS | HEART RATE: 86 BPM | SYSTOLIC BLOOD PRESSURE: 90 MMHG | RESPIRATION RATE: 16 BRPM | BODY MASS INDEX: 28.7 KG/M2 | TEMPERATURE: 97.4 F | DIASTOLIC BLOOD PRESSURE: 60 MMHG | OXYGEN SATURATION: 95 %

## 2023-11-27 DIAGNOSIS — Z00.00 ENCOUNTER FOR GENERAL ADULT MEDICAL EXAMINATION W/OUT ABNORMAL FINDINGS: ICD-10-CM

## 2023-11-27 DIAGNOSIS — R00.2 PALPITATIONS: ICD-10-CM

## 2023-11-27 DIAGNOSIS — E78.5 HYPERLIPIDEMIA, UNSPECIFIED: ICD-10-CM

## 2023-11-27 DIAGNOSIS — I65.29 OCCLUSION AND STENOSIS OF UNSPECIFIED CAROTID ARTERY: ICD-10-CM

## 2023-11-27 DIAGNOSIS — R06.00 DYSPNEA, UNSPECIFIED: ICD-10-CM

## 2023-11-27 PROCEDURE — 93000 ELECTROCARDIOGRAM COMPLETE: CPT

## 2023-11-27 PROCEDURE — 99205 OFFICE O/P NEW HI 60 MIN: CPT

## 2023-11-27 RX ORDER — IPRATROPIUM BROMIDE 21 UG/1
0.03 SPRAY NASAL TWICE DAILY
Refills: 0 | Status: ACTIVE | COMMUNITY

## 2023-11-30 PROBLEM — R00.2 PALPITATIONS: Status: ACTIVE | Noted: 2023-11-30

## 2023-11-30 PROBLEM — I65.29 CAROTID ARTERY PLAQUE: Status: ACTIVE | Noted: 2023-11-30

## 2024-01-29 ENCOUNTER — APPOINTMENT (OUTPATIENT)
Dept: HEART AND VASCULAR | Facility: CLINIC | Age: 84
End: 2024-01-29
Payer: MEDICARE

## 2024-01-29 VITALS
WEIGHT: 153 LBS | SYSTOLIC BLOOD PRESSURE: 142 MMHG | HEART RATE: 79 BPM | OXYGEN SATURATION: 98 % | DIASTOLIC BLOOD PRESSURE: 80 MMHG | HEIGHT: 61 IN | BODY MASS INDEX: 28.89 KG/M2

## 2024-01-29 PROCEDURE — 93320 DOPPLER ECHO COMPLETE: CPT

## 2024-01-29 PROCEDURE — 93351 STRESS TTE COMPLETE: CPT

## 2024-01-29 PROCEDURE — 93880 EXTRACRANIAL BILAT STUDY: CPT

## 2024-01-29 PROCEDURE — 93325 DOPPLER ECHO COLOR FLOW MAPG: CPT

## 2024-02-02 ENCOUNTER — NON-APPOINTMENT (OUTPATIENT)
Age: 84
End: 2024-02-02

## 2024-02-03 ENCOUNTER — NON-APPOINTMENT (OUTPATIENT)
Age: 84
End: 2024-02-03

## 2024-02-06 ENCOUNTER — APPOINTMENT (OUTPATIENT)
Dept: BREAST CENTER | Facility: CLINIC | Age: 84
End: 2024-02-06
Payer: MEDICARE

## 2024-02-06 VITALS
BODY MASS INDEX: 28.32 KG/M2 | HEIGHT: 61 IN | DIASTOLIC BLOOD PRESSURE: 74 MMHG | WEIGHT: 150 LBS | HEART RATE: 83 BPM | SYSTOLIC BLOOD PRESSURE: 113 MMHG

## 2024-02-06 DIAGNOSIS — Z92.3 PERSONAL HISTORY OF IRRADIATION: ICD-10-CM

## 2024-02-06 DIAGNOSIS — C50.811 MALIGNANT NEOPLASM OF OVERLAPPING SITES OF RIGHT FEMALE BREAST: ICD-10-CM

## 2024-02-06 DIAGNOSIS — R92.30 DENSE BREASTS, UNSPECIFIED: ICD-10-CM

## 2024-02-06 PROCEDURE — 99214 OFFICE O/P EST MOD 30 MIN: CPT

## 2024-02-06 RX ORDER — ESCITALOPRAM OXALATE 5 MG/1
TABLET, FILM COATED ORAL
Refills: 0 | Status: ACTIVE | COMMUNITY

## 2024-02-06 RX ORDER — HYDROCORTISONE 25 MG/G
2.5 CREAM TOPICAL
Refills: 0 | Status: DISCONTINUED | COMMUNITY
Start: 2021-12-06 | End: 2024-02-06

## 2024-02-06 NOTE — PHYSICAL EXAM
[Normocephalic] : normocephalic [Atraumatic] : atraumatic [Supple] : supple [No Supraclavicular Adenopathy] : no supraclavicular adenopathy [Examined in the supine and seated position] : examined in the supine and seated position [Symmetrical] : symmetrical [No dominant masses] : no dominant masses in right breast  [No dominant masses] : no dominant masses left breast [No Nipple Retraction] : no left nipple retraction [No Nipple Discharge] : no left nipple discharge [No Axillary Lymphadenopathy] : no left axillary lymphadenopathy [No Edema] : no edema [No Rashes] : no rashes [No Ulceration] : no ulceration [de-identified] : s/p Pmx mild brawny induration, healed transverse incision 3:00 scar with  nodularity c/w fat necrosis [de-identified] : Healed axillary incision

## 2024-02-06 NOTE — CONSULT LETTER
[Dear  ___] : Dear  [unfilled], [Courtesy Letter:] : I had the pleasure of seeing your patient, [unfilled], in my office today. [Please see my note below.] : Please see my note below. [Consult Closing:] : Thank you very much for allowing me to participate in the care of this patient.  If you have any questions, please do not hesitate to contact me. [Sincerely,] : Sincerely, [DrClaire  ___] : Dr. VAZQUEZ [FreeTextEntry3] : Makayla Sánchez MS DO\par  Breast Surgeon\par  [facility] \[place] [address]\par   [DrClaire ___] : Dr. VAZQUEZ

## 2024-02-06 NOTE — HISTORY OF PRESENT ILLNESS
[FreeTextEntry1] : This is a 82 yo female patient with a hx of breast cancer. Patient presents today for a follow up CBE. Patient is s/p right breast partial mastectomy on 02/05/2014 ([facility]). 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 Patient is s/p RTX in 05/2014. Patient is s/p right breast stereotactic biopsy on 12/10/2013. Pathology showed right breast 6:00 DCIS high nuclear grade, solid pattern with extension into lobules necrosis and calcifications ER/NM+ HER2-, Ki-67 negative favorable tumor suppression gene (p53) negative favorable. Patient is s/p usd breast biopsy on 12/06/2013. 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 Patient is s/p left breast biopsy in 1980. Pathology showed sclerosing adenosis.  She is on anastrozole with Dr. Malone.  She had moderna left completed 3/15/2021. She is s/p right breast 6N2 1/12/2021 [facility] path showed fat necrosis.  Patient had lyme disease last August. Son was diagnosed with testicular cancer Jan 2021.  Patient gets less of her intermittent "needle type" twinges in the right breast very randomly. No pain or tenderness in the breast. Patient has no breast complaints today.   She does not perform SBE.  She has not noticed a change in her breast or a breast lump. She has not noticed a change in her nipple or nipple area. She has not noticed a change in the skin of the breast. She is not experiencing nipple discharge. She is not experiencing breast pain. She has not noticed a lump or lymph node under the armpit.   BREAST CANCER RISK FACTORS Menarche: 10 Date of LMP: 1993 Menopause: Post  Grav:  5    Para: 4 Age at first live birth: 24 Nursed: No  Hysterectomy: No  Oophorectomy: No  OCP: Yes in the past for 3-4 months  HRT: No  Last pap/pelvic exam: 2023 WNL  Related family history: son testicular cancer diagnosed @57 Ashkenazi: None  Mastery risk assessment: N/A  BRCA testing: No  Bra size: 37C  Last mammogram:   01/08/2024       Location: [facility] Report reviewed.                              Results: Birads 2 Dense breasts, stable exam with no evidence of malignancy.  Last ultrasound:  1/08/2024             Location: [facility] Report reviewed.                              Results: Birads 2 stable exam with no evidence of malignancy.   Last MRI:   2/19/2019                                        Location: [facility] Report reviewed. Results: Birads 2 right breast Right breast developing focus of necrosis measuring 14 x 8 mm located 3-4 cm from the nipple probably correlating with the nodularity on physical exam. No evidence of malignancy bilaterally  Birads 1 left breast

## 2024-02-06 NOTE — ASSESSMENT
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 Malone  We reviewed risk reduction strategies including maintaining a BMI <25, limiting red meat intake and alcoholic beverages to 3 per week and exercise (150 min/ week low intensity or 75 min/week high intensity). And maintaining a normal vitamin D level. Reviewed possible causes of breast pain such as limiting caffeine, stress, and lying on the breasts Discussed vit e oil and arnica gel to help decrease breast discomfort  f/u with me 6 months She knows to call or return sooner should any concerns or questions arise.

## 2024-02-12 ENCOUNTER — NON-APPOINTMENT (OUTPATIENT)
Age: 84
End: 2024-02-12

## 2024-03-18 ENCOUNTER — APPOINTMENT (OUTPATIENT)
Dept: HEART AND VASCULAR | Facility: CLINIC | Age: 84
End: 2024-03-18

## 2024-04-11 ENCOUNTER — APPOINTMENT (OUTPATIENT)
Dept: HEART AND VASCULAR | Facility: CLINIC | Age: 84
End: 2024-04-11
Payer: MEDICARE

## 2024-04-11 VITALS
OXYGEN SATURATION: 96 % | HEIGHT: 61 IN | BODY MASS INDEX: 28.89 KG/M2 | DIASTOLIC BLOOD PRESSURE: 78 MMHG | HEART RATE: 78 BPM | WEIGHT: 153 LBS | SYSTOLIC BLOOD PRESSURE: 110 MMHG | TEMPERATURE: 98 F

## 2024-04-11 DIAGNOSIS — I34.0 NONRHEUMATIC MITRAL (VALVE) INSUFFICIENCY: ICD-10-CM

## 2024-04-11 DIAGNOSIS — C50.919 MALIGNANT NEOPLASM OF UNSPECIFIED SITE OF UNSPECIFIED FEMALE BREAST: ICD-10-CM

## 2024-04-11 DIAGNOSIS — I10 ESSENTIAL (PRIMARY) HYPERTENSION: ICD-10-CM

## 2024-04-11 DIAGNOSIS — E03.9 HYPOTHYROIDISM, UNSPECIFIED: ICD-10-CM

## 2024-04-11 DIAGNOSIS — R06.09 OTHER FORMS OF DYSPNEA: ICD-10-CM

## 2024-04-11 PROCEDURE — 99213 OFFICE O/P EST LOW 20 MIN: CPT

## 2024-04-11 RX ORDER — OLMESARTAN MEDOXOMIL 5 MG/1
5 TABLET, FILM COATED ORAL
Refills: 0 | Status: ACTIVE | COMMUNITY

## 2024-04-14 NOTE — REASON FOR VISIT
[Symptom and Test Evaluation] : symptom and test evaluation [Hyperlipidemia] : hyperlipidemia [Hypertension] : hypertension [FreeTextEntry1] : 83 yo F with hx of HTN, high cholesterol, breast cancer s/p lumpectomy and on Anastrozole (Dr Mccann) transitioning care from Dr Barrera. She c/o dyspnea with effort and had a stress echo in Jan '24. She exercises regularly and denies chest pain. No palpitations, dizziness or near syncope.

## 2024-04-14 NOTE — DISCUSSION/SUMMARY
[FreeTextEntry1] : Stress echo 1/29/24 - No ischemia.  mild to mod MR Reassured her.   Continue current meds and regular exercise She has an appt with Dr Mccann to discuss stopping Anastrozole.  Its been over 5 yrs. Follow-up in 6 months

## 2024-08-14 ENCOUNTER — NON-APPOINTMENT (OUTPATIENT)
Age: 84
End: 2024-08-14

## 2024-08-15 ENCOUNTER — APPOINTMENT (OUTPATIENT)
Dept: HEART AND VASCULAR | Facility: CLINIC | Age: 84
End: 2024-08-15
Payer: MEDICARE

## 2024-08-15 VITALS
HEIGHT: 61 IN | OXYGEN SATURATION: 97 % | WEIGHT: 159 LBS | TEMPERATURE: 97.8 F | SYSTOLIC BLOOD PRESSURE: 120 MMHG | RESPIRATION RATE: 16 BRPM | HEART RATE: 81 BPM | DIASTOLIC BLOOD PRESSURE: 82 MMHG | BODY MASS INDEX: 30.02 KG/M2

## 2024-08-15 PROCEDURE — 99213 OFFICE O/P EST LOW 20 MIN: CPT

## 2024-08-15 NOTE — DISCUSSION/SUMMARY
[FreeTextEntry1] : Stress echo 1/29/24 - No ischemia.  mild to mod MR Reassured her.   Continue current meds and regular exercise followed with Dr Mccann and was advised to continue Anastrozole.  Follow-up in 6 months

## 2024-08-15 NOTE — HISTORY OF PRESENT ILLNESS
[FreeTextEntry1] : 85 yo F with hx of HTN, high cholesterol, breast cancer s/p lumpectomy and on Anastrozole (Dr Mccann) transitioning care from Dr Barrera. She c/o dyspnea with effort and had a stress echo in Jan '24. She exercises regularly and denies chest pain. No palpitations, dizziness or near syncope.  Saw Dr Meng and had x rays for hip which showed arthritis.

## 2024-08-15 NOTE — PHYSICAL EXAM
[Well Developed] : well developed [Well Nourished] : well nourished [No Acute Distress] : no acute distress [Normal Conjunctiva] : normal conjunctiva [Normal Venous Pressure] : normal venous pressure [No Carotid Bruit] : no carotid bruit [Normal S1, S2] : normal S1, S2 [Clear Lung Fields] : clear lung fields [No Respiratory Distress] : no respiratory distress  [Soft] : abdomen soft [Non Tender] : non-tender [Normal Gait] : normal gait [No Edema] : no edema [No Rash] : no rash [No Skin Lesions] : no skin lesions [Moves all extremities] : moves all extremities [No Focal Deficits] : no focal deficits [Normal Speech] : normal speech [Alert and Oriented] : alert and oriented [de-identified] : 2/6 systolic murmur

## 2024-08-15 NOTE — PHYSICAL EXAM
[Well Developed] : well developed [Well Nourished] : well nourished [No Acute Distress] : no acute distress [Normal Conjunctiva] : normal conjunctiva [Normal Venous Pressure] : normal venous pressure [No Carotid Bruit] : no carotid bruit [Normal S1, S2] : normal S1, S2 [Clear Lung Fields] : clear lung fields [No Respiratory Distress] : no respiratory distress  [Soft] : abdomen soft [Non Tender] : non-tender [Normal Gait] : normal gait [No Edema] : no edema [No Rash] : no rash [No Skin Lesions] : no skin lesions [Moves all extremities] : moves all extremities [No Focal Deficits] : no focal deficits [Normal Speech] : normal speech [Alert and Oriented] : alert and oriented [de-identified] : 2/6 systolic murmur

## 2024-09-17 ENCOUNTER — APPOINTMENT (OUTPATIENT)
Dept: BREAST CENTER | Facility: CLINIC | Age: 84
End: 2024-09-17
Payer: MEDICARE

## 2024-09-17 VITALS
HEIGHT: 61 IN | SYSTOLIC BLOOD PRESSURE: 130 MMHG | WEIGHT: 155 LBS | HEART RATE: 78 BPM | DIASTOLIC BLOOD PRESSURE: 79 MMHG | BODY MASS INDEX: 29.27 KG/M2

## 2024-09-17 DIAGNOSIS — Z12.31 ENCOUNTER FOR SCREENING MAMMOGRAM FOR MALIGNANT NEOPLASM OF BREAST: ICD-10-CM

## 2024-09-17 DIAGNOSIS — C50.919 MALIGNANT NEOPLASM OF UNSPECIFIED SITE OF UNSPECIFIED FEMALE BREAST: ICD-10-CM

## 2024-09-17 DIAGNOSIS — C50.811 MALIGNANT NEOPLASM OF OVERLAPPING SITES OF RIGHT FEMALE BREAST: ICD-10-CM

## 2024-09-17 DIAGNOSIS — R92.30 DENSE BREASTS, UNSPECIFIED: ICD-10-CM

## 2024-09-17 DIAGNOSIS — Z92.3 PERSONAL HISTORY OF IRRADIATION: ICD-10-CM

## 2024-09-17 PROCEDURE — 99213 OFFICE O/P EST LOW 20 MIN: CPT

## 2024-09-17 NOTE — PHYSICAL EXAM
[Normocephalic] : normocephalic [Atraumatic] : atraumatic [Supple] : supple [No Supraclavicular Adenopathy] : no supraclavicular adenopathy [Examined in the supine and seated position] : examined in the supine and seated position [Symmetrical] : symmetrical [No dominant masses] : no dominant masses in right breast  [No dominant masses] : no dominant masses left breast [No Nipple Retraction] : no left nipple retraction [No Nipple Discharge] : no left nipple discharge [No Axillary Lymphadenopathy] : no left axillary lymphadenopathy [No Edema] : no edema [No Rashes] : no rashes [No Ulceration] : no ulceration [de-identified] : s/p Pmx mild brawny induration, healed transverse incision 3:00 scar with  nodularity c/w fat necrosis [de-identified] : Healed axillary incision

## 2024-09-17 NOTE — HISTORY OF PRESENT ILLNESS
[FreeTextEntry1] : This is a 83 yo female patient with a hx of breast cancer. Patient presents today for a follow up CBE. Patient is s/p right breast partial mastectomy on 02/05/2014 ([facility]). 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 Patient is s/p RTX in 05/2014. Patient is s/p right breast stereotactic biopsy on 12/10/2013. Pathology showed right breast 6:00 DCIS high nuclear grade, solid pattern with extension into lobules necrosis and calcifications ER/AR+ HER2-, Ki-67 negative favorable tumor suppression gene (p53) negative favorable. Patient is s/p usd breast biopsy on 12/06/2013. 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 Patient is s/p left breast biopsy in 1980. Pathology showed sclerosing adenosis.  She is on anastrozole with Dr. Malone.  She had moderna left completed 3/15/2021. She is s/p right breast 6N2 1/12/2021 [facility] path showed fat necrosis.  Patient had lyme disease last August. Son was diagnosed with testicular cancer Jan 2021.  Patient gets less of her intermittent "needle type" twinges in the right breast very randomly. No pain or tenderness in the breast. Patient has no breast complaints today.   She does not perform SBE.  She has not noticed a change in her breast or a breast lump. She has not noticed a change in her nipple or nipple area. She has not noticed a change in the skin of the breast. She is not experiencing nipple discharge. She is not experiencing breast pain. right side is achy at times. She has not noticed a lump or lymph node under the armpit.   BREAST CANCER RISK FACTORS Menarche: 10 Date of LMP: 1993 Menopause: Post  Grav:  5    Para: 4 Age at first live birth: 24 Nursed: No  Hysterectomy: No  Oophorectomy: No  OCP: Yes in the past for 3-4 months  HRT: No  Last pap/pelvic exam: 2023 WNL  Related family history: son testicular cancer diagnosed @57 Ashkenazi: None  Mastery risk assessment: N/A  BRCA testing: No  Bra size: 37C  Last mammogram:   01/08/2024       Location: [facility] Report reviewed.                              Results: Birads 2 Dense breasts, stable exam with no evidence of malignancy.  Last ultrasound:  1/08/2024             Location: [facility] Report reviewed.                              Results: Birads 2 stable exam with no evidence of malignancy.   Last MRI:   2/19/2019                                        Location: [facility] Report reviewed. Results: Birads 2 right breast Right breast developing focus of necrosis measuring 14 x 8 mm located 3-4 cm from the nipple probably correlating with the nodularity on physical exam. No evidence of malignancy bilaterally  Birads 1 left breast

## 2024-09-17 NOTE — HISTORY OF PRESENT ILLNESS
[FreeTextEntry1] : This is a 83 yo female patient with a hx of breast cancer. Patient presents today for a follow up CBE. Patient is s/p right breast partial mastectomy on 02/05/2014 ([facility]). 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 Patient is s/p RTX in 05/2014. Patient is s/p right breast stereotactic biopsy on 12/10/2013. Pathology showed right breast 6:00 DCIS high nuclear grade, solid pattern with extension into lobules necrosis and calcifications ER/MA+ HER2-, Ki-67 negative favorable tumor suppression gene (p53) negative favorable. Patient is s/p usd breast biopsy on 12/06/2013. 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 Patient is s/p left breast biopsy in 1980. Pathology showed sclerosing adenosis.  She is on anastrozole with Dr. Malone.  She had moderna left completed 3/15/2021. She is s/p right breast 6N2 1/12/2021 [facility] path showed fat necrosis.  Patient had lyme disease last August. Son was diagnosed with testicular cancer Jan 2021.  Patient gets less of her intermittent "needle type" twinges in the right breast very randomly. No pain or tenderness in the breast. Patient has no breast complaints today.   She does not perform SBE.  She has not noticed a change in her breast or a breast lump. She has not noticed a change in her nipple or nipple area. She has not noticed a change in the skin of the breast. She is not experiencing nipple discharge. She is not experiencing breast pain. right side is achy at times. She has not noticed a lump or lymph node under the armpit.   BREAST CANCER RISK FACTORS Menarche: 10 Date of LMP: 1993 Menopause: Post  Grav:  5    Para: 4 Age at first live birth: 24 Nursed: No  Hysterectomy: No  Oophorectomy: No  OCP: Yes in the past for 3-4 months  HRT: No  Last pap/pelvic exam: 2023 WNL  Related family history: son testicular cancer diagnosed @57 Ashkenazi: None  Mastery risk assessment: N/A  BRCA testing: No  Bra size: 37C  Last mammogram:   01/08/2024       Location: [facility] Report reviewed.                              Results: Birads 2 Dense breasts, stable exam with no evidence of malignancy.  Last ultrasound:  1/08/2024             Location: [facility] Report reviewed.                              Results: Birads 2 stable exam with no evidence of malignancy.   Last MRI:   2/19/2019                                        Location: [facility] Report reviewed. Results: Birads 2 right breast Right breast developing focus of necrosis measuring 14 x 8 mm located 3-4 cm from the nipple probably correlating with the nodularity on physical exam. No evidence of malignancy bilaterally  Birads 1 left breast

## 2024-09-17 NOTE — PHYSICAL EXAM
[Normocephalic] : normocephalic [Atraumatic] : atraumatic [Supple] : supple [No Supraclavicular Adenopathy] : no supraclavicular adenopathy [Examined in the supine and seated position] : examined in the supine and seated position [Symmetrical] : symmetrical [No dominant masses] : no dominant masses in right breast  [No dominant masses] : no dominant masses left breast [No Nipple Retraction] : no left nipple retraction [No Nipple Discharge] : no left nipple discharge [No Axillary Lymphadenopathy] : no left axillary lymphadenopathy [No Edema] : no edema [No Rashes] : no rashes [No Ulceration] : no ulceration [de-identified] : s/p Pmx mild brawny induration, healed transverse incision 3:00 scar with  nodularity c/w fat necrosis [de-identified] : Healed axillary incision

## 2024-09-17 NOTE — CONSULT LETTER
[Dear  ___] : Dear  [unfilled], [Courtesy Letter:] : I had the pleasure of seeing your patient, [unfilled], in my office today. [Please see my note below.] : Please see my note below. [Consult Closing:] : Thank you very much for allowing me to participate in the care of this patient.  If you have any questions, please do not hesitate to contact me. [Sincerely,] : Sincerely, [DrClaire  ___] : Dr. VAZQUEZ [DrClaire ___] : Dr. VAZQUEZ [FreeTextEntry3] : Makayla Sánchez MS DO\par  Breast Surgeon\par  [facility] \[place] [address]\par

## 2024-09-17 NOTE — ASSESSMENT
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 Malone  We reviewed risk reduction strategies including maintaining a BMI <25, limiting red meat intake and alcoholic beverages to 3 per week and exercise (150 min/ week low intensity or 75 min/week high intensity). And maintaining a normal vitamin D level. Reviewed possible causes of breast pain such as limiting caffeine, stress, and lying on the breasts Discussed vit e oil and arnica gel to help decrease breast discomfort  f/u with me 6 months She knows to call or return sooner should any concerns or questions arise.

## 2024-09-17 NOTE — CONSULT LETTER
[Dear  ___] : Dear  [unfilled], [Courtesy Letter:] : I had the pleasure of seeing your patient, [unfilled], in my office today. [Please see my note below.] : Please see my note below. [Consult Closing:] : Thank you very much for allowing me to participate in the care of this patient.  If you have any questions, please do not hesitate to contact me. [Sincerely,] : Sincerely, [DrClaire  ___] : Dr. VAZQUEZ [DrClaire ___] : Dr. VAZQUEZ [FreeTextEntry3] : Maakyla Sánchez MS DO\par  Breast Surgeon\par  [facility] \[place] [address]\par

## 2024-09-17 NOTE — HISTORY OF PRESENT ILLNESS
[FreeTextEntry1] : This is a 83 yo female patient with a hx of breast cancer. Patient presents today for a follow up CBE. Patient is s/p right breast partial mastectomy on 02/05/2014 ([facility]). 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 Patient is s/p RTX in 05/2014. Patient is s/p right breast stereotactic biopsy on 12/10/2013. Pathology showed right breast 6:00 DCIS high nuclear grade, solid pattern with extension into lobules necrosis and calcifications ER/AZ+ HER2-, Ki-67 negative favorable tumor suppression gene (p53) negative favorable. Patient is s/p usd breast biopsy on 12/06/2013. 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 Patient is s/p left breast biopsy in 1980. Pathology showed sclerosing adenosis.  She is on anastrozole with Dr. Malone.  She had moderna left completed 3/15/2021. She is s/p right breast 6N2 1/12/2021 [facility] path showed fat necrosis.  Patient had lyme disease last August. Son was diagnosed with testicular cancer Jan 2021.  Patient gets less of her intermittent "needle type" twinges in the right breast very randomly. No pain or tenderness in the breast. Patient has no breast complaints today.   She does not perform SBE.  She has not noticed a change in her breast or a breast lump. She has not noticed a change in her nipple or nipple area. She has not noticed a change in the skin of the breast. She is not experiencing nipple discharge. She is not experiencing breast pain. right side is achy at times. She has not noticed a lump or lymph node under the armpit.   BREAST CANCER RISK FACTORS Menarche: 10 Date of LMP: 1993 Menopause: Post  Grav:  5    Para: 4 Age at first live birth: 24 Nursed: No  Hysterectomy: No  Oophorectomy: No  OCP: Yes in the past for 3-4 months  HRT: No  Last pap/pelvic exam: 2023 WNL  Related family history: son testicular cancer diagnosed @57 Ashkenazi: None  Mastery risk assessment: N/A  BRCA testing: No  Bra size: 37C  Last mammogram:   01/08/2024       Location: [facility] Report reviewed.                              Results: Birads 2 Dense breasts, stable exam with no evidence of malignancy.  Last ultrasound:  1/08/2024             Location: [facility] Report reviewed.                              Results: Birads 2 stable exam with no evidence of malignancy.   Last MRI:   2/19/2019                                        Location: [facility] Report reviewed. Results: Birads 2 right breast Right breast developing focus of necrosis measuring 14 x 8 mm located 3-4 cm from the nipple probably correlating with the nodularity on physical exam. No evidence of malignancy bilaterally  Birads 1 left breast

## 2024-09-17 NOTE — REVIEW OF SYSTEMS
[Nasal Discharge] : nasal discharge [Snoring] : snoring [Heartburn] : heartburn [Incontinence] : incontinence [Loss of Hair] : loss of hair [Negative] : Heme/Lymph [Breast Pain] : breast pain

## 2024-09-17 NOTE — PHYSICAL EXAM
[Normocephalic] : normocephalic [Atraumatic] : atraumatic [Supple] : supple [No Supraclavicular Adenopathy] : no supraclavicular adenopathy [Examined in the supine and seated position] : examined in the supine and seated position [Symmetrical] : symmetrical [No dominant masses] : no dominant masses in right breast  [No dominant masses] : no dominant masses left breast [No Nipple Retraction] : no left nipple retraction [No Nipple Discharge] : no left nipple discharge [No Axillary Lymphadenopathy] : no left axillary lymphadenopathy [No Edema] : no edema [No Rashes] : no rashes [No Ulceration] : no ulceration [de-identified] : Healed axillary incision [de-identified] : s/p Pmx mild brawny induration, healed transverse incision 3:00 scar with  nodularity c/w fat necrosis

## 2024-09-17 NOTE — CONSULT LETTER
[Dear  ___] : Dear  [unfilled], [Courtesy Letter:] : I had the pleasure of seeing your patient, [unfilled], in my office today. [Please see my note below.] : Please see my note below. [Consult Closing:] : Thank you very much for allowing me to participate in the care of this patient.  If you have any questions, please do not hesitate to contact me. [Sincerely,] : Sincerely, [DrClaire  ___] : Dr. VAZQUEZ [DrClaire ___] : Dr. VAZQUEZ [FreeTextEntry3] : Makayla Sánchez MS DO\par  Breast Surgeon\par  [facility] \[place] [address]\par

## 2025-02-27 ENCOUNTER — NON-APPOINTMENT (OUTPATIENT)
Age: 85
End: 2025-02-27

## 2025-02-27 ENCOUNTER — APPOINTMENT (OUTPATIENT)
Dept: HEART AND VASCULAR | Facility: CLINIC | Age: 85
End: 2025-02-27
Payer: MEDICARE

## 2025-02-27 VITALS
HEART RATE: 86 BPM | SYSTOLIC BLOOD PRESSURE: 106 MMHG | BODY MASS INDEX: 31.53 KG/M2 | OXYGEN SATURATION: 98 % | DIASTOLIC BLOOD PRESSURE: 72 MMHG | HEIGHT: 61 IN | RESPIRATION RATE: 16 BRPM | WEIGHT: 167 LBS

## 2025-02-27 DIAGNOSIS — C50.919 MALIGNANT NEOPLASM OF UNSPECIFIED SITE OF UNSPECIFIED FEMALE BREAST: ICD-10-CM

## 2025-02-27 DIAGNOSIS — Z92.3 PERSONAL HISTORY OF IRRADIATION: ICD-10-CM

## 2025-02-27 DIAGNOSIS — I10 ESSENTIAL (PRIMARY) HYPERTENSION: ICD-10-CM

## 2025-02-27 DIAGNOSIS — E78.5 HYPERLIPIDEMIA, UNSPECIFIED: ICD-10-CM

## 2025-02-27 DIAGNOSIS — I34.0 NONRHEUMATIC MITRAL (VALVE) INSUFFICIENCY: ICD-10-CM

## 2025-02-27 PROCEDURE — 93000 ELECTROCARDIOGRAM COMPLETE: CPT

## 2025-02-27 PROCEDURE — 99213 OFFICE O/P EST LOW 20 MIN: CPT

## 2025-03-24 PROBLEM — R92.2 INCONCLUSIVE MAMMOGRAM: Status: ACTIVE | Noted: 2025-03-24

## 2025-03-25 ENCOUNTER — APPOINTMENT (OUTPATIENT)
Dept: BREAST CENTER | Facility: CLINIC | Age: 85
End: 2025-03-25
Payer: MEDICARE

## 2025-03-25 VITALS
HEIGHT: 61 IN | SYSTOLIC BLOOD PRESSURE: 137 MMHG | WEIGHT: 167 LBS | DIASTOLIC BLOOD PRESSURE: 90 MMHG | BODY MASS INDEX: 31.53 KG/M2 | HEART RATE: 93 BPM

## 2025-03-25 DIAGNOSIS — Z80.52 FAMILY HISTORY OF MALIGNANT NEOPLASM OF BLADDER: ICD-10-CM

## 2025-03-25 DIAGNOSIS — R92.30 DENSE BREASTS, UNSPECIFIED: ICD-10-CM

## 2025-03-25 DIAGNOSIS — C50.919 MALIGNANT NEOPLASM OF UNSPECIFIED SITE OF UNSPECIFIED FEMALE BREAST: ICD-10-CM

## 2025-03-25 DIAGNOSIS — Z17.0 ESTROGEN RECEPTOR POSITIVE STATUS [ER+]: ICD-10-CM

## 2025-03-25 DIAGNOSIS — R92.2 INCONCLUSIVE MAMMOGRAM: ICD-10-CM

## 2025-03-25 DIAGNOSIS — C50.811 MALIGNANT NEOPLASM OF OVERLAPPING SITES OF RIGHT FEMALE BREAST: ICD-10-CM

## 2025-03-25 DIAGNOSIS — Z80.1 FAMILY HISTORY OF MALIGNANT NEOPLASM OF TRACHEA, BRONCHUS AND LUNG: ICD-10-CM

## 2025-03-25 DIAGNOSIS — Z12.31 ENCOUNTER FOR SCREENING MAMMOGRAM FOR MALIGNANT NEOPLASM OF BREAST: ICD-10-CM

## 2025-03-25 PROCEDURE — 99213 OFFICE O/P EST LOW 20 MIN: CPT

## 2025-08-28 ENCOUNTER — APPOINTMENT (OUTPATIENT)
Dept: HEART AND VASCULAR | Facility: CLINIC | Age: 85
End: 2025-08-28
Payer: MEDICARE

## 2025-08-28 VITALS
SYSTOLIC BLOOD PRESSURE: 120 MMHG | HEIGHT: 61 IN | BODY MASS INDEX: 29.64 KG/M2 | WEIGHT: 157 LBS | DIASTOLIC BLOOD PRESSURE: 76 MMHG | RESPIRATION RATE: 16 BRPM | HEART RATE: 92 BPM | OXYGEN SATURATION: 96 %

## 2025-08-28 DIAGNOSIS — E78.5 HYPERLIPIDEMIA, UNSPECIFIED: ICD-10-CM

## 2025-08-28 DIAGNOSIS — R06.09 OTHER FORMS OF DYSPNEA: ICD-10-CM

## 2025-08-28 DIAGNOSIS — E03.9 HYPOTHYROIDISM, UNSPECIFIED: ICD-10-CM

## 2025-08-28 DIAGNOSIS — I34.0 NONRHEUMATIC MITRAL (VALVE) INSUFFICIENCY: ICD-10-CM

## 2025-08-28 DIAGNOSIS — I10 ESSENTIAL (PRIMARY) HYPERTENSION: ICD-10-CM

## 2025-08-28 DIAGNOSIS — C50.919 MALIGNANT NEOPLASM OF UNSPECIFIED SITE OF UNSPECIFIED FEMALE BREAST: ICD-10-CM

## 2025-08-28 PROCEDURE — 99213 OFFICE O/P EST LOW 20 MIN: CPT

## 2025-08-28 RX ORDER — LEVOTHYROXINE SODIUM 0.1 MG/1
100 TABLET ORAL DAILY
Refills: 0 | Status: ACTIVE | COMMUNITY